# Patient Record
Sex: FEMALE | Race: ASIAN | NOT HISPANIC OR LATINO | Employment: UNEMPLOYED | URBAN - METROPOLITAN AREA
[De-identification: names, ages, dates, MRNs, and addresses within clinical notes are randomized per-mention and may not be internally consistent; named-entity substitution may affect disease eponyms.]

---

## 2019-03-01 ENCOUNTER — PATIENT MESSAGE (OUTPATIENT)
Dept: FAMILY MEDICINE | Facility: CLINIC | Age: 34
End: 2019-03-01

## 2019-03-01 ENCOUNTER — OFFICE VISIT (OUTPATIENT)
Dept: FAMILY MEDICINE | Facility: CLINIC | Age: 34
End: 2019-03-01
Payer: COMMERCIAL

## 2019-03-01 ENCOUNTER — LAB VISIT (OUTPATIENT)
Dept: LAB | Facility: HOSPITAL | Age: 34
End: 2019-03-01
Attending: INTERNAL MEDICINE
Payer: COMMERCIAL

## 2019-03-01 VITALS
WEIGHT: 175.06 LBS | DIASTOLIC BLOOD PRESSURE: 70 MMHG | HEIGHT: 64 IN | BODY MASS INDEX: 29.89 KG/M2 | HEART RATE: 65 BPM | SYSTOLIC BLOOD PRESSURE: 110 MMHG | TEMPERATURE: 98 F

## 2019-03-01 DIAGNOSIS — Z00.00 ANNUAL PHYSICAL EXAM: ICD-10-CM

## 2019-03-01 DIAGNOSIS — Z00.00 ANNUAL PHYSICAL EXAM: Primary | ICD-10-CM

## 2019-03-01 DIAGNOSIS — E04.2 MULTIPLE THYROID NODULES: ICD-10-CM

## 2019-03-01 DIAGNOSIS — R17 SERUM TOTAL BILIRUBIN ELEVATED: Primary | ICD-10-CM

## 2019-03-01 LAB
ALBUMIN SERPL BCP-MCNC: 4 G/DL
ALP SERPL-CCNC: 75 U/L
ALT SERPL W/O P-5'-P-CCNC: 15 U/L
ANION GAP SERPL CALC-SCNC: 10 MMOL/L
AST SERPL-CCNC: 17 U/L
BASOPHILS # BLD AUTO: 0.05 K/UL
BASOPHILS NFR BLD: 0.7 %
BILIRUB SERPL-MCNC: 1.4 MG/DL
BUN SERPL-MCNC: 12 MG/DL
CALCIUM SERPL-MCNC: 9.7 MG/DL
CHLORIDE SERPL-SCNC: 104 MMOL/L
CHOLEST SERPL-MCNC: 195 MG/DL
CHOLEST/HDLC SERPL: 4.1 {RATIO}
CO2 SERPL-SCNC: 25 MMOL/L
CREAT SERPL-MCNC: 0.8 MG/DL
DIFFERENTIAL METHOD: NORMAL
EOSINOPHIL # BLD AUTO: 0.4 K/UL
EOSINOPHIL NFR BLD: 5 %
ERYTHROCYTE [DISTWIDTH] IN BLOOD BY AUTOMATED COUNT: 11.6 %
EST. GFR  (AFRICAN AMERICAN): >60 ML/MIN/1.73 M^2
EST. GFR  (NON AFRICAN AMERICAN): >60 ML/MIN/1.73 M^2
ESTIMATED AVG GLUCOSE: 103 MG/DL
GLUCOSE SERPL-MCNC: 86 MG/DL
HBA1C MFR BLD HPLC: 5.2 %
HCT VFR BLD AUTO: 40.9 %
HDLC SERPL-MCNC: 47 MG/DL
HDLC SERPL: 24.1 %
HGB BLD-MCNC: 14.1 G/DL
IMM GRANULOCYTES # BLD AUTO: 0.01 K/UL
IMM GRANULOCYTES NFR BLD AUTO: 0.1 %
LDLC SERPL CALC-MCNC: 120.6 MG/DL
LYMPHOCYTES # BLD AUTO: 1.9 K/UL
LYMPHOCYTES NFR BLD: 27.6 %
MCH RBC QN AUTO: 31 PG
MCHC RBC AUTO-ENTMCNC: 34.5 G/DL
MCV RBC AUTO: 90 FL
MONOCYTES # BLD AUTO: 0.5 K/UL
MONOCYTES NFR BLD: 6.4 %
NEUTROPHILS # BLD AUTO: 4.2 K/UL
NEUTROPHILS NFR BLD: 60.2 %
NONHDLC SERPL-MCNC: 148 MG/DL
NRBC BLD-RTO: 0 /100 WBC
PLATELET # BLD AUTO: 282 K/UL
PMV BLD AUTO: 10.1 FL
POTASSIUM SERPL-SCNC: 4.4 MMOL/L
PROT SERPL-MCNC: 7.3 G/DL
RBC # BLD AUTO: 4.55 M/UL
SODIUM SERPL-SCNC: 139 MMOL/L
TRIGL SERPL-MCNC: 137 MG/DL
TSH SERPL DL<=0.005 MIU/L-ACNC: 0.42 UIU/ML
WBC # BLD AUTO: 6.99 K/UL

## 2019-03-01 PROCEDURE — 99385 PREV VISIT NEW AGE 18-39: CPT | Mod: S$GLB,,, | Performed by: INTERNAL MEDICINE

## 2019-03-01 PROCEDURE — 99385 PR PREVENTIVE VISIT,NEW,18-39: ICD-10-PCS | Mod: S$GLB,,, | Performed by: INTERNAL MEDICINE

## 2019-03-01 PROCEDURE — 80061 LIPID PANEL: CPT

## 2019-03-01 PROCEDURE — 84443 ASSAY THYROID STIM HORMONE: CPT

## 2019-03-01 PROCEDURE — 99999 PR PBB SHADOW E&M-NEW PATIENT-LVL III: ICD-10-PCS | Mod: PBBFAC,,, | Performed by: INTERNAL MEDICINE

## 2019-03-01 PROCEDURE — 80053 COMPREHEN METABOLIC PANEL: CPT

## 2019-03-01 PROCEDURE — 85025 COMPLETE CBC W/AUTO DIFF WBC: CPT

## 2019-03-01 PROCEDURE — 99999 PR PBB SHADOW E&M-NEW PATIENT-LVL III: CPT | Mod: PBBFAC,,, | Performed by: INTERNAL MEDICINE

## 2019-03-01 PROCEDURE — 36415 COLL VENOUS BLD VENIPUNCTURE: CPT | Mod: PO

## 2019-03-01 PROCEDURE — 83036 HEMOGLOBIN GLYCOSYLATED A1C: CPT

## 2019-03-01 NOTE — PROGRESS NOTES
Subjective:        Patient ID: Janneth Javier is a 33 y.o. female.    Chief Complaint: Establish Care and Annual Exam    HPI   Janneth Javier presents for annual exam.    Pt is accompanied by her .    Review of Systems   Constitutional: Negative for activity change.   HENT: Negative for hearing loss.    Eyes: Negative for visual disturbance (contact lenses UTD).   Respiratory: Negative for shortness of breath.    Cardiovascular: Negative for chest pain and leg swelling.   Gastrointestinal: Negative for abdominal pain.   Genitourinary: Negative for menstrual problem (usually q28-32 days; missed 2 cycles (Dec, Jan), LMP 2/13/19 normal, usually doesn't miss periods, neg pregnancy tests) and vaginal discharge.   Musculoskeletal: Negative for gait problem.   Skin: Negative for rash.   Allergic/Immunologic: Negative for immunocompromised state.   Neurological: Negative for weakness.   Hematological: Negative for adenopathy.   Psychiatric/Behavioral: Negative for confusion.           Objective:        Vitals:    03/01/19 0757   BP: 110/70   Pulse: 65   Temp: 98.1 °F (36.7 °C)     Physical Exam   Constitutional: She is oriented to person, place, and time. She appears well-developed and well-nourished. No distress.   HENT:   Head: Normocephalic and atraumatic.   Right Ear: External ear normal.   Left Ear: External ear normal.   Nose: Nose normal.   - bilateral ear canals clear, tympanic membranes visualized - normal color and light reflex   Eyes: Conjunctivae and EOM are normal.   Neck: Normal range of motion. Neck supple. No thyromegaly present.   Cardiovascular: Normal rate, regular rhythm and normal heart sounds.   No murmur heard.  Pulmonary/Chest: Effort normal and breath sounds normal. No respiratory distress.   Abdominal: Soft. She exhibits no distension. There is no tenderness. There is no guarding.   Musculoskeletal: She exhibits no edema.   Lymphadenopathy:     She has no cervical adenopathy.   Neurological: She  is alert and oriented to person, place, and time.   Skin: Skin is warm and dry.   Psychiatric: She has a normal mood and affect. Her behavior is normal. Judgment and thought content normal.   Vitals reviewed.          Assessment:         1. Annual physical exam    2. Multiple thyroid nodules              Plan:         Janneth was seen today for establish care and annual exam.    Diagnoses and all orders for this visit:    Annual physical exam  - fasting labs today  - discussed tracking food intake to monitor carbs; pt has a significant famhx of DM  -     CBC auto differential; Future  -     Comprehensive metabolic panel; Future  -     Hemoglobin A1c; Future  -     Lipid panel; Future  -     TSH; Future    Multiple thyroid nodules: Pt had a thyroid US before that showed benign nodules.  Check TSH today.        Follow-up for pending lab results.

## 2019-03-04 ENCOUNTER — PATIENT MESSAGE (OUTPATIENT)
Dept: FAMILY MEDICINE | Facility: CLINIC | Age: 34
End: 2019-03-04

## 2019-04-15 ENCOUNTER — PATIENT MESSAGE (OUTPATIENT)
Dept: FAMILY MEDICINE | Facility: CLINIC | Age: 34
End: 2019-04-15

## 2019-04-27 ENCOUNTER — PATIENT MESSAGE (OUTPATIENT)
Dept: FAMILY MEDICINE | Facility: CLINIC | Age: 34
End: 2019-04-27

## 2019-04-27 DIAGNOSIS — L30.2 ID REACTION: ICD-10-CM

## 2019-04-27 DIAGNOSIS — R17 SERUM TOTAL BILIRUBIN ELEVATED: Primary | ICD-10-CM

## 2019-04-29 ENCOUNTER — LAB VISIT (OUTPATIENT)
Dept: LAB | Facility: HOSPITAL | Age: 34
End: 2019-04-29
Attending: INTERNAL MEDICINE
Payer: COMMERCIAL

## 2019-04-29 ENCOUNTER — CLINICAL SUPPORT (OUTPATIENT)
Dept: FAMILY MEDICINE | Facility: CLINIC | Age: 34
End: 2019-04-29
Payer: COMMERCIAL

## 2019-04-29 DIAGNOSIS — Z11.1 SCREENING-PULMONARY TB: Primary | ICD-10-CM

## 2019-04-29 DIAGNOSIS — R17 SERUM TOTAL BILIRUBIN ELEVATED: ICD-10-CM

## 2019-04-29 LAB
ALBUMIN SERPL BCP-MCNC: 4 G/DL (ref 3.5–5.2)
ALP SERPL-CCNC: 69 U/L (ref 55–135)
ALT SERPL W/O P-5'-P-CCNC: 16 U/L (ref 10–44)
AST SERPL-CCNC: 16 U/L (ref 10–40)
BILIRUB DIRECT SERPL-MCNC: 0.5 MG/DL (ref 0.1–0.3)
BILIRUB SERPL-MCNC: 1.4 MG/DL (ref 0.1–1)
PROT SERPL-MCNC: 7.3 G/DL (ref 6–8.4)

## 2019-04-29 PROCEDURE — 36415 COLL VENOUS BLD VENIPUNCTURE: CPT | Mod: PO

## 2019-04-29 PROCEDURE — 86580 TB INTRADERMAL TEST: CPT | Mod: S$GLB,,, | Performed by: INTERNAL MEDICINE

## 2019-04-29 PROCEDURE — 86580 POCT TB SKIN TEST: ICD-10-PCS | Mod: S$GLB,,, | Performed by: INTERNAL MEDICINE

## 2019-04-29 PROCEDURE — 99999 PR PBB SHADOW E&M-EST. PATIENT-LVL I: CPT | Mod: PBBFAC,,,

## 2019-04-29 PROCEDURE — 80076 HEPATIC FUNCTION PANEL: CPT

## 2019-04-29 PROCEDURE — 99999 PR PBB SHADOW E&M-EST. PATIENT-LVL I: ICD-10-PCS | Mod: PBBFAC,,,

## 2019-05-01 ENCOUNTER — CLINICAL SUPPORT (OUTPATIENT)
Dept: FAMILY MEDICINE | Facility: CLINIC | Age: 34
End: 2019-05-01
Payer: COMMERCIAL

## 2019-05-01 LAB
TB INDURATION - 48 HR READ: NORMAL MM
TB INDURATION - 72 HR READ: NORMAL MM
TB SKIN TEST - 48 HR READ: NEGATIVE
TB SKIN TEST - 72 HR READ: NORMAL

## 2019-05-01 PROCEDURE — 99999 PR PBB SHADOW E&M-EST. PATIENT-LVL I: ICD-10-PCS | Mod: PBBFAC,,,

## 2019-05-01 PROCEDURE — 99999 PR PBB SHADOW E&M-EST. PATIENT-LVL I: CPT | Mod: PBBFAC,,,

## 2019-05-03 ENCOUNTER — HOSPITAL ENCOUNTER (OUTPATIENT)
Dept: RADIOLOGY | Facility: OTHER | Age: 34
Discharge: HOME | End: 2019-05-03
Attending: INTERNAL MEDICINE
Payer: COMMERCIAL

## 2019-05-03 DIAGNOSIS — K86.2 PANCREATIC CYST: ICD-10-CM

## 2019-05-03 DIAGNOSIS — K86.2 PANCREATIC CYST: Primary | ICD-10-CM

## 2019-05-03 DIAGNOSIS — R17 SERUM TOTAL BILIRUBIN ELEVATED: ICD-10-CM

## 2019-05-03 PROCEDURE — 76700 US EXAM ABDOM COMPLETE: CPT | Mod: 26,,, | Performed by: RADIOLOGY

## 2019-05-03 PROCEDURE — 76700 US EXAM ABDOM COMPLETE: CPT | Mod: TC

## 2019-05-03 PROCEDURE — 76700 US ABDOMEN COMPLETE: ICD-10-PCS | Mod: 26,,, | Performed by: RADIOLOGY

## 2019-05-27 ENCOUNTER — TELEPHONE (OUTPATIENT)
Dept: GASTROENTEROLOGY | Facility: CLINIC | Age: 34
End: 2019-05-27

## 2019-05-27 NOTE — TELEPHONE ENCOUNTER
Please contact this patient to reschedule to [facility] for Pancreatic cyst and elevated LFTS

## 2019-05-27 NOTE — TELEPHONE ENCOUNTER
Spoke with patient in regards to rescheduling to [facility] for Pancreatic cyst. Patient is aware that appt is for 6/6/19 at 8:30am in [place].

## 2019-06-06 ENCOUNTER — TELEPHONE (OUTPATIENT)
Dept: GASTROENTEROLOGY | Facility: CLINIC | Age: 34
End: 2019-06-06

## 2019-06-06 ENCOUNTER — OFFICE VISIT (OUTPATIENT)
Dept: GASTROENTEROLOGY | Facility: CLINIC | Age: 34
End: 2019-06-06
Payer: COMMERCIAL

## 2019-06-06 VITALS — BODY MASS INDEX: 29.74 KG/M2 | WEIGHT: 173.31 LBS

## 2019-06-06 DIAGNOSIS — K86.2 PANCREATIC CYST: Primary | ICD-10-CM

## 2019-06-06 DIAGNOSIS — R17 SERUM TOTAL BILIRUBIN ELEVATED: ICD-10-CM

## 2019-06-06 DIAGNOSIS — K86.2 PANCREAS CYST: Primary | ICD-10-CM

## 2019-06-06 PROCEDURE — 99999 PR PBB SHADOW E&M-EST. PATIENT-LVL III: CPT | Mod: PBBFAC,,,

## 2019-06-06 PROCEDURE — 99203 OFFICE O/P NEW LOW 30 MIN: CPT | Mod: S$GLB,,, | Performed by: INTERNAL MEDICINE

## 2019-06-06 PROCEDURE — 99203 PR OFFICE/OUTPT VISIT, NEW, LEVL III, 30-44 MIN: ICD-10-PCS | Mod: S$GLB,,, | Performed by: INTERNAL MEDICINE

## 2019-06-06 PROCEDURE — 99999 PR PBB SHADOW E&M-EST. PATIENT-LVL III: ICD-10-PCS | Mod: PBBFAC,,,

## 2019-06-06 NOTE — PATIENT INSTRUCTIONS
Please call Hedy Chen [phone]        Your Upper EUS is scheduled for   TBA     At [facility] which is located at [address] [place] [address].  You will check in at the Hospital Admit Desk located on the 1st floor of the hospital. Please call the the office if you have any questions at [phone]      Upper Endoscopic Ultrasound    Endoscopic ultrasound(EUS) is a procedure used to image the digestive tract, including pancreas, lesions in esophagus and stomach.  It is used to diagnose and stage cancers of the digestive tract.  If necessary, your doctor may need to take samples during the procedure.    A responsible adult (family member or friend) must come with you and transport you home.  You are not allowed to drive, take a taxi or bus or leave the Endoscopy Center alone.  If you do not have a responsible adult with you to take you home, your exam will be cancelled.     If you have questions about the cost of your procedure, you should contact your insurance company as soon as possible.  Please bring a picture ID and your insurance card.  You will sign  treatment authorization forms at check in.  It is necessary for you to sign these forms again even if you recently signed these at the time of your clinic visit.    Please follow instructions of  if you are taking anticoagulant/blood thinning medications such as Aggrenox, aspirin, Brilinta, Effient, Eliquis, Lovenox, Plavix, Pletal, Pradaxa, Ticilid, Xarelto or Coumadin.     Take blood pressure, heart, anti-rejection and or seizure medication at 600 am the morning of the procedure.    Skip your morning dose of insulin or other oral medications for diabetes the morning of the procedure unless instructed otherwise by your doctor.      Day Before The Procedure    Eat a light evening meal and eat no solid food after 7 pm.  You may drink clear liquids including:    Water, Coffee or decaffeinated coffee (no milk or cream)  Tea, Herbal  tea  Carbonated beverages (soft drinks), regular and sugar free  Gelatin  Apple Juice, grape juice, white cranberry juice  Gatorade, Power Aid, Crystal Light, [employer]  Lemonade and Limeade  Bouillon, clear consomme'  Snowball, popsicles  DO NOT DRINK ANY LIQUIDS CONTAINING RED DYE  DO NOT DRINK ANY LIQUIDS NOT SPECIFICALLY LISTED  DO NOT DRINK ALCOHOL  NOTHING BY MOUTH AFTER MIDNIGHT    Day of Procedure    600 am take last dose of any medications you are allowed to take with a small amount of clear liquid

## 2019-06-06 NOTE — PROGRESS NOTES
[facility] Clinic Note    Reason for Visit:  The encounter diagnosis was Pancreas cyst.    PCP: Bella Garner   No address on file    HPI:  This is a 33 y.o. female here for evaluation of incidental pancreas cyst. The patient have history of mildly elevated bilirubin for what an abdominal US was performed.    US 5/3/2019    1. 1.1 x 1.3 x 1.4-cm cystic lesion that appears to project from the pancreatic tail with a single thin internal septation without appreciable vascularity, nodularity or calcifications.  Differential diagnosis includes mimics such as fluid within the lesser sac or duodenal diverticulum, as well as true pancreatic lesions such as pancreatic pseudocyst in the setting of known history of pancreatitis, serous cystadenoma, mucinous cystic neoplasm and IPMN.  Consider contrast enhanced cross-sectional imaging (CT/MR) for more accurate assessment if clinically indicated.    The patient denied abdominal pain or diarrhea. No family history of pancreatitis or pancreatic cancer. The patient denied previous history of pancreatitis. No smoking.        ROS:  CV: No chest pain or palpitations  Pulm: No cough, No shortness of breath  GI: see HPI  Derm: stated very sensitive skin  Heme: No lymphadenopathy, No bruising, No blood transfusions  MSK: No arthritis      Medical History:  has a past medical history of Multiple thyroid nodules (3/1/2019).    Surgical History:  has no past surgical history on file.    Family History: family history includes Diabetes Mellitus in her mother; Hyperlipidemia in her mother; Hypertension in her mother; Lung cancer in her paternal grandmother; Stomach cancer in her father..     Social History:  reports that she has never smoked. She does not have any smokeless tobacco history on file.    Review of patient's allergies indicates:  No Known Allergies    Objective Findings:    Vital Signs:  Wt 78.6 kg (173 lb 4.5 oz)   BMI 29.74 kg/m²   Body mass index is 29.74 kg/m².    Physical  Exam:  General Appearance: Well appearing in no acute distress  HEENT:   Normocephalic, without obvious abnormality.  Sclera anicteric, no conjunctival pallor, Lips, mucosa, and tongue pink and dry; teeth and gums normal  Lungs: CTA bilaterally in anterior and posterior fields, no wheezes, no crackles.  Heart:  Regular rate and rhythm, S1, S2 normal, no murmurs heard  Abdomen: Soft, non tender, non distended with positive bowel sounds in all four quadrants. No hepatosplenomegaly, ascites, or mass  Extremities: no cyanosis or edema  Neurologic: Grossly normal      Labs:  Lab Results   Component Value Date    WBC 6.99 03/01/2019    HGB 14.1 03/01/2019    HCT 40.9 03/01/2019     03/01/2019    CHOL 195 03/01/2019    TRIG 137 03/01/2019    HDL 47 03/01/2019    ALT 16 04/29/2019    AST 16 04/29/2019     03/01/2019    K 4.4 03/01/2019     03/01/2019    CREATININE 0.8 03/01/2019    BUN 12 03/01/2019    CO2 25 03/01/2019    TSH 0.418 03/01/2019    HGBA1C 5.2 03/01/2019              Assessment:  1. Pancreas cyst    The different pancreas cyst were discussed as well the natural history of this lesions including the potential for malignant transformation. We recommend to proceed with further diagnostic evaluation.       Recommendations:  1. Options included EUS vs CT scan vs MRI. All the diagnostic procedures were explained to the patient with the pros and cons. The patient will make a decision and get back to us.         Order summary:  No orders of the defined types were placed in this encounter.      Thank you so much for allowing me to participate in the care of Janneth Bearden MD

## 2019-06-26 ENCOUNTER — TELEPHONE (OUTPATIENT)
Dept: ENDOSCOPY | Facility: HOSPITAL | Age: 34
End: 2019-06-26

## 2019-06-26 NOTE — TELEPHONE ENCOUNTER
Left a message to call back at    Bet 8am to 4pm  For Upper EUS on Friday 6/28/19  Arrival time at 0730 am  Sent a message through her Portal

## 2019-06-26 NOTE — TELEPHONE ENCOUNTER
Spoke with patient about arrival time @ *.[phone]     NPO status reviewed: Patient may eat until 7:00pm.  After 7pm, pt may have CLEAR liquids ONLY until completely NPO at Midnight.    Medications: Do not take Insulin or oral diabetic medications the day of the procedure.    Take as prescribed: heart, seizure and blood pressure medication in the morning with a sip of water (less than an ounce).  Take any breathing medications and bring inhalers to hospital with you.     Leave all valuables and jewelry at home. Wear comfortable clothes to procedure to change into hospital gown.   You cannot drive for 24 hours after your procedure because you will receive sedation for your procedure to make you comfortable.    A ride must be provided at discharge.

## 2019-06-28 ENCOUNTER — HOSPITAL ENCOUNTER (OUTPATIENT)
Facility: HOSPITAL | Age: 34
Discharge: HOME | End: 2019-06-28
Attending: INTERNAL MEDICINE | Admitting: INTERNAL MEDICINE
Payer: COMMERCIAL

## 2019-06-28 ENCOUNTER — ANESTHESIA (OUTPATIENT)
Dept: ENDOSCOPY | Facility: HOSPITAL | Age: 34
End: 2019-06-28
Payer: COMMERCIAL

## 2019-06-28 ENCOUNTER — ANESTHESIA EVENT (OUTPATIENT)
Dept: ENDOSCOPY | Facility: HOSPITAL | Age: 34
End: 2019-06-28
Payer: COMMERCIAL

## 2019-06-28 VITALS
HEART RATE: 56 BPM | DIASTOLIC BLOOD PRESSURE: 77 MMHG | RESPIRATION RATE: 17 BRPM | OXYGEN SATURATION: 99 % | TEMPERATURE: 98 F | SYSTOLIC BLOOD PRESSURE: 123 MMHG | BODY MASS INDEX: 30.12 KG/M2 | HEIGHT: 63 IN | WEIGHT: 170 LBS

## 2019-06-28 DIAGNOSIS — K86.2 PANCREAS CYST: ICD-10-CM

## 2019-06-28 DIAGNOSIS — K86.2 PANCREATIC CYST: Primary | ICD-10-CM

## 2019-06-28 LAB
B-HCG UR QL: NEGATIVE
CTP QC/QA: YES

## 2019-06-28 PROCEDURE — 63600175 PHARM REV CODE 636 W HCPCS: Performed by: NURSE ANESTHETIST, CERTIFIED REGISTERED

## 2019-06-28 PROCEDURE — 43259 EGD US EXAM DUODENUM/JEJUNUM: CPT | Performed by: INTERNAL MEDICINE

## 2019-06-28 PROCEDURE — 25000003 PHARM REV CODE 250: Performed by: INTERNAL MEDICINE

## 2019-06-28 PROCEDURE — 43237 PR ENDOSCOPIC US EXAM, ESOPH: ICD-10-PCS | Mod: ,,, | Performed by: INTERNAL MEDICINE

## 2019-06-28 PROCEDURE — 37000008 HC ANESTHESIA 1ST 15 MINUTES: Performed by: INTERNAL MEDICINE

## 2019-06-28 PROCEDURE — 43237 ENDOSCOPIC US EXAM ESOPH: CPT | Mod: ,,, | Performed by: INTERNAL MEDICINE

## 2019-06-28 PROCEDURE — 37000009 HC ANESTHESIA EA ADD 15 MINS: Performed by: INTERNAL MEDICINE

## 2019-06-28 PROCEDURE — 81025 URINE PREGNANCY TEST: CPT | Performed by: INTERNAL MEDICINE

## 2019-06-28 RX ORDER — SODIUM CHLORIDE 0.9 % (FLUSH) 0.9 %
10 SYRINGE (ML) INJECTION
Status: ACTIVE | OUTPATIENT
Start: 2019-06-28

## 2019-06-28 RX ORDER — SODIUM CHLORIDE 9 MG/ML
INJECTION, SOLUTION INTRAVENOUS CONTINUOUS
Status: ACTIVE | OUTPATIENT
Start: 2019-06-28

## 2019-06-28 RX ORDER — PROPOFOL 10 MG/ML
VIAL (ML) INTRAVENOUS CONTINUOUS PRN
Status: DISCONTINUED | OUTPATIENT
Start: 2019-06-28 | End: 2019-06-28

## 2019-06-28 RX ORDER — LIDOCAINE HCL/PF 100 MG/5ML
SYRINGE (ML) INTRAVENOUS
Status: DISCONTINUED | OUTPATIENT
Start: 2019-06-28 | End: 2019-06-28

## 2019-06-28 RX ORDER — PROPOFOL 10 MG/ML
VIAL (ML) INTRAVENOUS
Status: DISCONTINUED | OUTPATIENT
Start: 2019-06-28 | End: 2019-06-28

## 2019-06-28 RX ADMIN — PROPOFOL 150 MCG/KG/MIN: 10 INJECTION, EMULSION INTRAVENOUS at 09:06

## 2019-06-28 RX ADMIN — PROPOFOL 70 MG: 10 INJECTION, EMULSION INTRAVENOUS at 09:06

## 2019-06-28 RX ADMIN — LIDOCAINE HYDROCHLORIDE 75 MG: 20 INJECTION, SOLUTION INTRAVENOUS at 09:06

## 2019-06-28 RX ADMIN — SODIUM CHLORIDE: 0.9 INJECTION, SOLUTION INTRAVENOUS at 08:06

## 2019-06-28 RX ADMIN — PROPOFOL 20 MG: 10 INJECTION, EMULSION INTRAVENOUS at 09:06

## 2019-06-28 NOTE — ANESTHESIA POSTPROCEDURE EVALUATION
Anesthesia Post Evaluation    Patient: Janneth Javier    Procedure(s) Performed: Procedure(s) (LRB):  ULTRASOUND-ENDOSCOPIC-UPPER (N/A)    Final Anesthesia Type: MAC  Patient location during evaluation: PACU  Patient participation: Yes- Able to Participate  Level of consciousness: awake and alert  Post-procedure vital signs: reviewed and stable  Pain management: adequate  Airway patency: patent  PONV status at discharge: No PONV  Anesthetic complications: no      Cardiovascular status: hemodynamically stable and blood pressure returned to baseline  Respiratory status: unassisted, spontaneous ventilation and room air  Hydration status: euvolemic  Follow-up not needed.          Vitals Value Taken Time   /77 6/28/2019 10:25 AM   Temp 36.9 °C (98.4 °F) 6/28/2019  9:55 AM   Pulse 56 6/28/2019 10:25 AM   Resp 17 6/28/2019 10:25 AM   SpO2 99 % 6/28/2019 10:25 AM         No case tracking events are documented in the log.      Pain/Shannan Score: Shannan Score: 10 (6/28/2019 10:25 AM)

## 2019-06-28 NOTE — ANESTHESIA PREPROCEDURE EVALUATION
06/28/2019  Janneth Javier is a 34 y.o., female for upper EUS under MAC    Past Medical History:   Diagnosis Date    Multiple thyroid nodules 3/1/2019    US showed benign nodules         Anesthesia Evaluation    I have reviewed the Patient Summary Reports.    I have reviewed the Nursing Notes.   I have reviewed the Medications.     Review of Systems  Social:  Non-Smoker        Physical Exam  General:  Obesity    Airway/Jaw/Neck:  Airway Findings: Mallampati: II      Chest/Lungs:  Chest/Lungs Clear    Heart/Vascular:  Heart Findings: Normal            Anesthesia Plan  Type of Anesthesia, risks & benefits discussed:  Anesthesia Type:  MAC, general  Patient's Preference:   Intra-op Monitoring Plan:   Intra-op Monitoring Plan Comments:   Post Op Pain Control Plan:   Post Op Pain Control Plan Comments:   Induction:    Beta Blocker:  Patient is not currently on a Beta-Blocker (No further documentation required).       Informed Consent: Patient understands risks and agrees with Anesthesia plan.  Questions answered. Anesthesia consent signed with patient.  ASA Score: 2     Day of Surgery Review of History & Physical:            Ready For Surgery From Anesthesia Perspective.

## 2019-06-28 NOTE — PLAN OF CARE
Past Medical History:   Diagnosis Date    Multiple thyroid nodules 3/1/2019    US showed benign nodules     Admission process complete. Patient ready for procedure. Plan of care reviewed with patient. Preoperative fasting appropriate for procedure and sedation. Call light in reach and bed in lowest position.

## 2019-06-28 NOTE — H&P
Short Stay Endoscopy History and Physical    PCP - Bella Garner MD  Referring Physician - Bella Garner MD  [address] [place] [address]    Procedure - eus  ASA - per anesthesia  Mallampati - per anesthesia  History of Anesthesia problems - no  Family history Anesthesia problems -  no   Plan of anesthesia - General    HPI:  This is a 34 y.o. female here for evaluation of: pancreas cyst    Reflux - no  Dysphagia - no  Abdominal pain - no  Diarrhea - no    ROS:  Constitutional: No fevers, chills, No weight loss  CV: No chest pain  Pulm: No cough, No shortness of breath  Ophtho: No vision changes  GI: see HPI  Derm: No rash    Medical History:  has a past medical history of Multiple thyroid nodules (3/1/2019).    Surgical History:  has no past surgical history on file.    Family History: family history includes Diabetes Mellitus in her mother; Hyperlipidemia in her mother; Hypertension in her mother; Lung cancer in her paternal grandmother; Stomach cancer in her father..    Social History:  reports that she has never smoked. She has never used smokeless tobacco. She reports that she drinks alcohol. She reports that she does not use drugs.    Review of patient's allergies indicates:  No Known Allergies    Medications:   No medications prior to admission.       Physical Exam:    Vital Signs:   Vitals:    06/28/19 0808   BP: 120/66   Pulse: (!) 59   Resp: 20   Temp: 98.8 °F (37.1 °C)       General Appearance: Well appearing in no acute distress    Labs:  Lab Results   Component Value Date    WBC 6.99 03/01/2019    HGB 14.1 03/01/2019    HCT 40.9 03/01/2019     03/01/2019    CHOL 195 03/01/2019    TRIG 137 03/01/2019    HDL 47 03/01/2019    ALT 16 04/29/2019    AST 16 04/29/2019     03/01/2019    K 4.4 03/01/2019     03/01/2019    CREATININE 0.8 03/01/2019    BUN 12 03/01/2019    CO2 25 03/01/2019    TSH 0.418 03/01/2019    HGBA1C 5.2 03/01/2019       I have explained  the risks and benefits of this endoscopic procedure to the patient including but not limited to bleeding, inflammation, infection, perforation, and death.      Roe Pereira MD

## 2019-06-28 NOTE — [employer] PATIENT INSTRUCTIONS
Discharge Summary/Instructions after an Endoscopic Procedure  Patient Name: Janneth Javier  Patient MRN: [id number]  Patient YOB: 1985 Friday, June 28, 2019  Roe Pereira MD  RESTRICTIONS:  During your procedure today, you received medications for sedation.  These   medications may affect your judgment, balance and coordination.  Therefore,   for 24 hours, you have the following restrictions:   - DO NOT drive a car, operate machinery, make legal/financial decisions,   sign important papers or drink alcohol.    ACTIVITY:  Today: no heavy lifting, straining or running due to procedural   sedation/anesthesia.  The following day: return to full activity including work.  DIET:  Eat and drink normally unless instructed otherwise.     TREATMENT FOR COMMON SIDE EFFECTS:  - Mild abdominal pain, nausea, belching, bloating or excessive gas:  rest,   eat lightly and use a heating pad.  - Sore Throat: treat with throat lozenges and/or gargle with warm salt   water.  - Because air was used during the procedure, expelling large amounts of air   from your rectum or belching is normal.  - If a bowel prep was taken, you may not have a bowel movement for 1-3 days.    This is normal.  SYMPTOMS TO WATCH FOR AND REPORT TO YOUR PHYSICIAN:  1. Abdominal pain or bloating, other than gas cramps.  2. Chest pain.  3. Back pain.  4. Signs of infection such as: chills or fever occurring within 24 hours   after the procedure.  5. Rectal bleeding, which would show as bright red, maroon, or black stools.   (A tablespoon of blood from the rectum is not serious, especially if   hemorrhoids are present.)  6. Vomiting.  7. Weakness or dizziness.  GO DIRECTLY TO THE NEAREST EMERGENCY ROOM IF YOU HAVE ANY OF THE FOLLOWING:      Difficulty breathing              Chills and/or fever over 101 F   Persistent vomiting and/or vomiting blood   Severe abdominal pain   Severe chest pain   Black, tarry stools   Bleeding- more than one tablespoon   Any  other symptom or condition that you feel may need urgent attention  Your doctor recommends these additional instructions:  If any biopsies were taken, your doctors clinic will contact you in 1 to 2   weeks with any results.  - Discharge patient to home.   - Resume previous diet.   - Continue present medications.   - Perform magnetic resonance imaging (MRI) with gadolinium in 6 months.  For questions, problems or results please call your physician - Roe Pereira MD at Work:  [phone].  EMERGENCY PHONE NUMBER: [phone],  LAB RESULTS: [phone]  IF A COMPLICATION OR EMERGENCY SITUATION ARISES AND YOU ARE UNABLE TO REACH   YOUR PHYSICIAN - GO DIRECTLY TO THE EMERGENCY ROOM.  Roe Pereira MD  6/28/2019 9:51:29 AM  This report has been verified and signed electronically.  [employer]

## 2019-06-28 NOTE — TRANSFER OF CARE
"Anesthesia Transfer of Care Note    Patient: Janneth Javier    Procedure(s) Performed: Procedure(s) (LRB):  ULTRASOUND-ENDOSCOPIC-UPPER (N/A)    Patient location: GI    Anesthesia Type: MAC    Transport from OR: Transported from OR on room air with adequate spontaneous ventilation    Post pain: adequate analgesia    Post assessment: no apparent anesthetic complications and tolerated procedure well    Post vital signs: stable    Level of consciousness: responds to stimulation and sedated    Nausea/Vomiting: no nausea/vomiting    Complications: none    Transfer of care protocol was followed      Last vitals:   Visit Vitals  /66 (BP Location: Left arm, Patient Position: Lying)   Pulse (!) 59   Temp 37.1 °C (98.8 °F) (Temporal)   Resp 20   Ht 5' 3" (1.6 m)   Wt 77.1 kg (170 lb)   LMP 05/25/2019 (Exact Date)   SpO2 99%   Breastfeeding? No   BMI 30.11 kg/m²     "

## 2019-07-01 ENCOUNTER — TELEPHONE (OUTPATIENT)
Dept: GASTROENTEROLOGY | Facility: CLINIC | Age: 34
End: 2019-07-01

## 2019-07-01 NOTE — TELEPHONE ENCOUNTER
Post procedure Instructions: Perform magnetic resonance imaging (MRI) with gadolinium in 6 months.. Recall placed

## 2019-10-15 ENCOUNTER — OFFICE VISIT (OUTPATIENT)
Dept: INTERNAL MEDICINE | Facility: CLINIC | Age: 34
End: 2019-10-15
Payer: COMMERCIAL

## 2019-10-15 VITALS
BODY MASS INDEX: 30.23 KG/M2 | WEIGHT: 170.63 LBS | SYSTOLIC BLOOD PRESSURE: 110 MMHG | DIASTOLIC BLOOD PRESSURE: 74 MMHG | TEMPERATURE: 99 F | HEART RATE: 84 BPM | RESPIRATION RATE: 19 BRPM | HEIGHT: 63 IN

## 2019-10-15 DIAGNOSIS — R68.83 CHILLS: ICD-10-CM

## 2019-10-15 DIAGNOSIS — R61 NIGHT SWEATS: ICD-10-CM

## 2019-10-15 DIAGNOSIS — R52 BODY ACHES: Primary | ICD-10-CM

## 2019-10-15 LAB
INFLUENZA A, MOLECULAR: NEGATIVE
INFLUENZA B, MOLECULAR: NEGATIVE
SPECIMEN SOURCE: NORMAL

## 2019-10-15 PROCEDURE — 99213 PR OFFICE/OUTPT VISIT, EST, LEVL III, 20-29 MIN: ICD-10-PCS | Mod: S$GLB,,, | Performed by: NURSE PRACTITIONER

## 2019-10-15 PROCEDURE — 99213 OFFICE O/P EST LOW 20 MIN: CPT | Mod: S$GLB,,, | Performed by: NURSE PRACTITIONER

## 2019-10-15 PROCEDURE — 87502 INFLUENZA DNA AMP PROBE: CPT | Mod: PO

## 2019-10-15 PROCEDURE — 99999 PR PBB SHADOW E&M-EST. PATIENT-LVL III: ICD-10-PCS | Mod: PBBFAC,,, | Performed by: NURSE PRACTITIONER

## 2019-10-15 PROCEDURE — 99999 PR PBB SHADOW E&M-EST. PATIENT-LVL III: CPT | Mod: PBBFAC,,, | Performed by: NURSE PRACTITIONER

## 2019-10-15 RX ORDER — FLUTICASONE PROPIONATE 50 MCG
1 SPRAY, SUSPENSION (ML) NASAL DAILY
Qty: 9.9 ML | Refills: 0 | Status: SHIPPED | OUTPATIENT
Start: 2019-10-15 | End: 2020-08-30

## 2019-10-15 RX ORDER — METHYLPREDNISOLONE 4 MG/1
TABLET ORAL
Qty: 1 PACKAGE | Refills: 0 | Status: SHIPPED | OUTPATIENT
Start: 2019-10-15 | End: 2019-11-05

## 2019-10-15 NOTE — LETTER
October 15, 2019      [facility]                                                                                                                                                       [address] [facility]  [place] [address]  Phone: [phone]  Fax: [phone]       Patient: Janneth Javier   YOB: 1985  Date of Visit: 10/15/2019    To Whom It May Concern:    Carmen Javier  was at [facility] on 10/14/19- 10/15/2019. She may return to work/school- need to get flu results and will instruct. If you have any questions or concerns, or if I can be of further assistance, please do not hesitate to contact me.    Sincerely,      Delphine Ferreira NP

## 2019-10-15 NOTE — PROGRESS NOTES
[facility] Note    Chief Complaint      Chief Complaint   Patient presents with    Generalized Body Aches    Chills     And sweats; intermittently    Fatigue     History of Present Illness      Janneth Javier is a 34 y.o. female patient of Dr. Dai who is new to me presents today for c/o head congestion, sore throat, sneezing,  fatigue, chills, sweats and body aches since yesterday. Possible exposure to flu, friend came to visit that was sick.   Pt is a community nurse      Problem List Items Addressed This Visit     None      Visit Diagnoses     Body aches    -  Primary    Relevant Orders    Influenza A & B by Molecular    Chills        Relevant Orders    Influenza A & B by Molecular    Night sweats        Relevant Orders    Influenza A & B by Molecular          Health Maintenance   Topic Date Due    Pap Smear with HPV Cotest  03/01/2021    TETANUS VACCINE  03/01/2026    Lipid Panel  Completed       Past Medical History:   Diagnosis Date    Multiple thyroid nodules 3/1/2019    US showed benign nodules       Past Surgical History:   Procedure Laterality Date    ENDOSCOPIC ULTRASOUND OF UPPER GASTROINTESTINAL TRACT N/A 6/28/2019    Procedure: ULTRASOUND-ENDOSCOPIC-UPPER;  Surgeon: Roe Pereira MD;  Location: [facility];  Service: Endoscopy;  Laterality: N/A;       family history includes Diabetes Mellitus in her mother; Hyperlipidemia in her mother; Hypertension in her mother; Lung cancer in her paternal grandmother; Stomach cancer in her father.    Social History     Tobacco Use    Smoking status: Never Smoker    Smokeless tobacco: Never Used   Substance Use Topics    Alcohol use: Yes     Comment: occasional    Drug use: Never       Review of Systems   Constitutional: Positive for chills, diaphoresis and fever.   HENT: Positive for congestion.    Respiratory: Negative for cough and shortness of breath.    Cardiovascular: Negative for chest pain, palpitations and leg swelling.  "  Gastrointestinal: Negative for abdominal pain, diarrhea, nausea and vomiting.   Musculoskeletal: Negative for myalgias.   Neurological: Positive for weakness. Negative for dizziness and headaches.   Psychiatric/Behavioral: Negative for depression. The patient is not nervous/anxious.         No outpatient encounter medications on file as of 10/15/2019.     Facility-Administered Encounter Medications as of 10/15/2019   Medication Dose Route Frequency Provider Last Rate Last Dose    0.9%  NaCl infusion   Intravenous Continuous Roe Pereira MD 20 mL/hr at 06/28/19 0815      sodium chloride 0.9% flush 10 mL  10 mL Intravenous PRN Roe Pereira MD            Review of patient's allergies indicates:  No Known Allergies    Physical Exam      Vital Signs  Temp: 98.7 °F (37.1 °C)  Temp src: Oral  Pulse: 84  Resp: 19  BP: 110/74  BP Location: Left arm  Patient Position: Sitting  Pain Score: 0-No pain  Height and Weight  Height: 5' 3" (160 cm)  Weight: 77.4 kg (170 lb 10.2 oz)  BSA (Calculated - sq m): 1.85 sq meters  BMI (Calculated): 30.3  Weight in (lb) to have BMI = 25: 140.8]    Physical Exam   Constitutional: She is oriented to person, place, and time. She appears well-developed and well-nourished.   HENT:   Head: Normocephalic and atraumatic.   Right Ear: External ear normal.   Left Ear: External ear normal.   Mouth/Throat: Oropharynx is clear and moist.   Redness to bilateral ears, erythema noted to oropharynx   Eyes: Pupils are equal, round, and reactive to light. Conjunctivae and EOM are normal.   Neck: Normal range of motion. Neck supple.   Cardiovascular: Normal rate, regular rhythm, normal heart sounds and intact distal pulses.   No murmur heard.  Pulmonary/Chest: Effort normal and breath sounds normal. She exhibits no tenderness.   Abdominal: Soft.   Musculoskeletal: Normal range of motion.   Neurological: She is alert and oriented to person, place, and time.   Skin: Skin is warm and dry.   Psychiatric: She " has a normal mood and affect. Her behavior is normal. Judgment and thought content normal.   Nursing note reviewed.       Laboratory:  CBC:  No results for input(s): WBC, RBC, HGB, HCT, PLT, MCV, MCH, MCHC in the last 2160 hours.  CMP:  No results for input(s): GLU, CALCIUM, ALBUMIN, PROT, NA, K, CO2, CL, BUN, ALKPHOS, ALT, AST, BILITOT in the last 2160 hours.    Invalid input(s): CREATININ  URINALYSIS:  No results for input(s): COLORU, CLARITYU, SPECGRAV, PHUR, PROTEINUA, GLUCOSEU, BILIRUBINCON, BLOODU, WBCU, RBCU, BACTERIA, MUCUS, NITRITE, LEUKOCYTESUR, UROBILINOGEN, HYALINECASTS in the last 2160 hours.   LIPIDS:  No results for input(s): TSH, HDL, CHOL, TRIG, LDLCALC, CHOLHDL, NONHDLCHOL, TOTALCHOLEST in the last 2160 hours.  TSH:  No results for input(s): TSH in the last 2160 hours.  A1C:  No results for input(s): HGBA1C in the last 2160 hours.      Assessment/Plan     Janneth Javier is a 34 y.o.female with:    1. Chills  - Influenza A & B by Molecular    2. Night sweats  - Influenza A & B by Molecular    3. Body aches  - Influenza A & B by Molecular      -Continue current medications and maintain follow up with specialists.  Return to clinic as needed for any concerns       Erin Jiminez, NP-C [facility] [place]

## 2019-10-15 NOTE — LETTER
October 15, 2019      [facility]                                                                                                                                                       [address].  [place] [address]  Phone: [phone]  Fax: [phone]       Patient: Janneth Javier   YOB: 1985  Date of Visit: 10/15/2019    To Whom It May Concern:    Carmen Javier  was at [facility] on 10/14/19- 10/15/2019. She may return to work/school on 10/16/2019 with no restrictions. If you have any questions or concerns, or if I can be of further assistance, please do not hesitate to contact me.    Sincerely,    Delphine Ferreira NP

## 2019-11-03 ENCOUNTER — TELEPHONE (OUTPATIENT)
Dept: ENDOSCOPY | Facility: HOSPITAL | Age: 34
End: 2019-11-03

## 2019-11-03 DIAGNOSIS — K86.2 PANCREATIC CYST: ICD-10-CM

## 2019-12-30 ENCOUNTER — TELEPHONE (OUTPATIENT)
Dept: GASTROENTEROLOGY | Facility: CLINIC | Age: 34
End: 2019-12-30

## 2019-12-30 NOTE — TELEPHONE ENCOUNTER
Spoke with pt to schedule MRI but pt stated she wanted to think about having it done and will message via portal to inform Dr. Pereira if she decides to have it done. I verbalized understanding.

## 2020-01-06 ENCOUNTER — PATIENT MESSAGE (OUTPATIENT)
Dept: GASTROENTEROLOGY | Facility: CLINIC | Age: 35
End: 2020-01-06

## 2020-02-17 ENCOUNTER — PATIENT MESSAGE (OUTPATIENT)
Dept: GASTROENTEROLOGY | Facility: CLINIC | Age: 35
End: 2020-02-17

## 2020-03-03 ENCOUNTER — HOSPITAL ENCOUNTER (OUTPATIENT)
Dept: RADIOLOGY | Facility: HOSPITAL | Age: 35
Discharge: HOME | End: 2020-03-03
Attending: INTERNAL MEDICINE
Payer: COMMERCIAL

## 2020-03-03 DIAGNOSIS — K86.2 PANCREATIC CYST: ICD-10-CM

## 2020-03-03 PROCEDURE — 74183 MRI ABDOMEN WITH AND WO_INC MRCP: ICD-10-PCS | Mod: 26,,, | Performed by: RADIOLOGY

## 2020-03-03 PROCEDURE — 76376 3D RENDER W/INTRP POSTPROCES: CPT | Mod: 26,,, | Performed by: RADIOLOGY

## 2020-03-03 PROCEDURE — 76376 MRI ABDOMEN WITH AND WO_INC MRCP: ICD-10-PCS | Mod: 26,,, | Performed by: RADIOLOGY

## 2020-03-03 PROCEDURE — 25500020 PHARM REV CODE 255: Performed by: INTERNAL MEDICINE

## 2020-03-03 PROCEDURE — 74183 MRI ABD W/O CNTR FLWD CNTR: CPT | Mod: 26,,, | Performed by: RADIOLOGY

## 2020-03-03 PROCEDURE — 76376 3D RENDER W/INTRP POSTPROCES: CPT | Mod: TC

## 2020-03-03 PROCEDURE — A9585 GADOBUTROL INJECTION: HCPCS | Performed by: INTERNAL MEDICINE

## 2020-03-03 RX ORDER — GADOBUTROL 604.72 MG/ML
10 INJECTION INTRAVENOUS
Status: COMPLETED | OUTPATIENT
Start: 2020-03-03 | End: 2020-03-03

## 2020-03-03 RX ADMIN — GADOBUTROL 10 ML: 604.72 INJECTION INTRAVENOUS at 07:03

## 2020-07-15 ENCOUNTER — TELEPHONE (OUTPATIENT)
Dept: ENDOSCOPY | Facility: HOSPITAL | Age: 35
End: 2020-07-15

## 2020-07-15 DIAGNOSIS — K86.2 PANCREATIC CYST: Primary | ICD-10-CM

## 2020-07-20 ENCOUNTER — TELEPHONE (OUTPATIENT)
Dept: ENDOSCOPY | Facility: HOSPITAL | Age: 35
End: 2020-07-20

## 2020-07-21 ENCOUNTER — TELEPHONE (OUTPATIENT)
Dept: ENDOSCOPY | Facility: HOSPITAL | Age: 35
End: 2020-07-21

## 2020-07-21 NOTE — TELEPHONE ENCOUNTER
----- Message from Arian Delcid sent at 7/21/2020  8:33 AM CDT -----  Contact: pt  Pt returning a call to the clinic from Kendra         Please contact pt: [phone]

## 2020-07-22 ENCOUNTER — TELEPHONE (OUTPATIENT)
Dept: ENDOSCOPY | Facility: HOSPITAL | Age: 35
End: 2020-07-22

## 2020-07-22 ENCOUNTER — PATIENT MESSAGE (OUTPATIENT)
Dept: ENDOSCOPY | Facility: HOSPITAL | Age: 35
End: 2020-07-22

## 2020-07-22 DIAGNOSIS — K86.2 PANCREATIC CYST: ICD-10-CM

## 2020-07-22 NOTE — TELEPHONE ENCOUNTER
----- Message from Loli Cox sent at 7/22/2020  8:08 AM CDT -----  Regarding: PT  Contact: PT  PT returned a missed call from Gissell from yesterday. PT said she can leave a message on her phone or she can reach her through the portal bc she's not always able to answer her phone during the day. Please call back     Callback: [phone]

## 2020-08-31 ENCOUNTER — OFFICE VISIT (OUTPATIENT)
Dept: INTERNAL MEDICINE | Facility: CLINIC | Age: 35
End: 2020-08-31
Payer: COMMERCIAL

## 2020-08-31 ENCOUNTER — PATIENT MESSAGE (OUTPATIENT)
Dept: INTERNAL MEDICINE | Facility: CLINIC | Age: 35
End: 2020-08-31

## 2020-08-31 ENCOUNTER — PATIENT MESSAGE (OUTPATIENT)
Dept: GASTROENTEROLOGY | Facility: HOSPITAL | Age: 35
End: 2020-08-31

## 2020-08-31 ENCOUNTER — TELEPHONE (OUTPATIENT)
Dept: ENDOSCOPY | Facility: HOSPITAL | Age: 35
End: 2020-08-31

## 2020-08-31 ENCOUNTER — HOSPITAL ENCOUNTER (OUTPATIENT)
Dept: RADIOLOGY | Facility: HOSPITAL | Age: 35
Discharge: HOME | End: 2020-08-31
Attending: INTERNAL MEDICINE
Payer: COMMERCIAL

## 2020-08-31 VITALS
DIASTOLIC BLOOD PRESSURE: 60 MMHG | TEMPERATURE: 98 F | WEIGHT: 176.56 LBS | SYSTOLIC BLOOD PRESSURE: 122 MMHG | BODY MASS INDEX: 31.29 KG/M2 | HEART RATE: 64 BPM | RESPIRATION RATE: 16 BRPM | HEIGHT: 63 IN

## 2020-08-31 DIAGNOSIS — K86.2 PANCREAS CYST: Primary | ICD-10-CM

## 2020-08-31 DIAGNOSIS — R17 SERUM TOTAL BILIRUBIN ELEVATED: ICD-10-CM

## 2020-08-31 DIAGNOSIS — K86.2 PANCREATIC CYST: ICD-10-CM

## 2020-08-31 DIAGNOSIS — Z12.83 SKIN EXAM, SCREENING FOR CANCER: ICD-10-CM

## 2020-08-31 DIAGNOSIS — Z12.83 SKIN EXAM, SCREENING FOR CANCER: Primary | ICD-10-CM

## 2020-08-31 DIAGNOSIS — Z00.00 ANNUAL PHYSICAL EXAM: Primary | ICD-10-CM

## 2020-08-31 PROCEDURE — 99395 PR PREVENTIVE VISIT,EST,18-39: ICD-10-PCS | Mod: S$GLB,,, | Performed by: INTERNAL MEDICINE

## 2020-08-31 PROCEDURE — 76376 3D RENDER W/INTRP POSTPROCES: CPT | Mod: TC

## 2020-08-31 PROCEDURE — 99395 PREV VISIT EST AGE 18-39: CPT | Mod: S$GLB,,, | Performed by: INTERNAL MEDICINE

## 2020-08-31 PROCEDURE — 99999 PR PBB SHADOW E&M-EST. PATIENT-LVL III: CPT | Mod: PBBFAC,,, | Performed by: INTERNAL MEDICINE

## 2020-08-31 PROCEDURE — 76376 3D RENDER W/INTRP POSTPROCES: CPT | Mod: 26,,, | Performed by: RADIOLOGY

## 2020-08-31 PROCEDURE — 76376 MRI ABDOMEN WITHOUT CONTRAST MRCP: ICD-10-PCS | Mod: 26,,, | Performed by: RADIOLOGY

## 2020-08-31 PROCEDURE — 99999 PR PBB SHADOW E&M-EST. PATIENT-LVL III: ICD-10-PCS | Mod: PBBFAC,,, | Performed by: INTERNAL MEDICINE

## 2020-08-31 PROCEDURE — 74181 MRI ABDOMEN WITHOUT CONTRAST MRCP: ICD-10-PCS | Mod: 26,,, | Performed by: RADIOLOGY

## 2020-08-31 PROCEDURE — 74181 MRI ABDOMEN W/O CONTRAST: CPT | Mod: 26,,, | Performed by: RADIOLOGY

## 2020-08-31 PROCEDURE — 74181 MRI ABDOMEN W/O CONTRAST: CPT | Mod: TC

## 2020-09-01 ENCOUNTER — PATIENT MESSAGE (OUTPATIENT)
Dept: INTERNAL MEDICINE | Facility: CLINIC | Age: 35
End: 2020-09-01

## 2020-09-01 ENCOUNTER — TELEPHONE (OUTPATIENT)
Dept: GASTROENTEROLOGY | Facility: CLINIC | Age: 35
End: 2020-09-01

## 2020-09-01 ENCOUNTER — PATIENT OUTREACH (OUTPATIENT)
Dept: ADMINISTRATIVE | Facility: HOSPITAL | Age: 35
End: 2020-09-01

## 2020-09-01 DIAGNOSIS — Z01.812 PRE-PROCEDURE LAB EXAM: Primary | ICD-10-CM

## 2020-09-01 DIAGNOSIS — E78.1 HYPERTRIGLYCERIDEMIA: Primary | ICD-10-CM

## 2020-09-01 NOTE — TELEPHONE ENCOUNTER
Patient is scheduled with Dr Pereira on 9/22/2020. Covid screen 9/19/2020 at 9:00am. Patient notified via patient portal.

## 2020-09-02 ENCOUNTER — PATIENT MESSAGE (OUTPATIENT)
Dept: INTERNAL MEDICINE | Facility: CLINIC | Age: 35
End: 2020-09-02

## 2020-09-18 ENCOUNTER — TELEPHONE (OUTPATIENT)
Dept: ENDOSCOPY | Facility: HOSPITAL | Age: 35
End: 2020-09-18

## 2020-09-18 NOTE — TELEPHONE ENCOUNTER
Spoke with patient about arrival time @ 0700.   Covid test = pnd, 09/19/20    NPO status reviewed: Patient may eat until 7:00pm.  After 7pm, pt may have CLEAR liquids ONLY until completely NPO at Midnight.    Medications: Do not take Insulin or oral diabetic medications the day of the procedure.    Take as prescribed: heart, seizure and blood pressure medication in the morning with a sip of water (less than an ounce).  Take any breathing medications and bring inhalers to hospital with you.     Leave all valuables and jewelry at home. Wear comfortable clothes to procedure to change into hospital gown.   You cannot drive for 24 hours after your procedure because you will receive sedation for your procedure to make you comfortable.    A ride must be provided at discharge.

## 2020-09-19 ENCOUNTER — LAB VISIT (OUTPATIENT)
Dept: URGENT CARE | Facility: CLINIC | Age: 35
End: 2020-09-19
Payer: COMMERCIAL

## 2020-09-19 VITALS — OXYGEN SATURATION: 98 % | HEART RATE: 72 BPM | TEMPERATURE: 98 F

## 2020-09-19 DIAGNOSIS — Z01.812 PRE-PROCEDURE LAB EXAM: ICD-10-CM

## 2020-09-19 PROCEDURE — U0003 INFECTIOUS AGENT DETECTION BY NUCLEIC ACID (DNA OR RNA); SEVERE ACUTE RESPIRATORY SYNDROME CORONAVIRUS 2 (SARS-COV-2) (CORONAVIRUS DISEASE [COVID-19]), AMPLIFIED PROBE TECHNIQUE, MAKING USE OF HIGH THROUGHPUT TECHNOLOGIES AS DESCRIBED BY CMS-2020-01-R: HCPCS

## 2020-09-20 LAB — SARS-COV-2 RNA RESP QL NAA+PROBE: NOT DETECTED

## 2020-09-22 ENCOUNTER — ANESTHESIA EVENT (OUTPATIENT)
Dept: ENDOSCOPY | Facility: HOSPITAL | Age: 35
End: 2020-09-22
Payer: COMMERCIAL

## 2020-09-22 ENCOUNTER — ANESTHESIA (OUTPATIENT)
Dept: ENDOSCOPY | Facility: HOSPITAL | Age: 35
End: 2020-09-22
Payer: COMMERCIAL

## 2020-09-22 ENCOUNTER — HOSPITAL ENCOUNTER (OUTPATIENT)
Facility: HOSPITAL | Age: 35
Discharge: HOME | End: 2020-09-22
Attending: INTERNAL MEDICINE | Admitting: INTERNAL MEDICINE
Payer: COMMERCIAL

## 2020-09-22 VITALS
WEIGHT: 179 LBS | TEMPERATURE: 98 F | RESPIRATION RATE: 12 BRPM | SYSTOLIC BLOOD PRESSURE: 101 MMHG | HEIGHT: 63 IN | OXYGEN SATURATION: 100 % | BODY MASS INDEX: 31.71 KG/M2 | DIASTOLIC BLOOD PRESSURE: 77 MMHG | HEART RATE: 60 BPM

## 2020-09-22 DIAGNOSIS — K86.2 PANCREAS CYST: ICD-10-CM

## 2020-09-22 PROCEDURE — 82378 CARCINOEMBRYONIC ANTIGEN: CPT

## 2020-09-22 PROCEDURE — 63600175 PHARM REV CODE 636 W HCPCS: Performed by: NURSE ANESTHETIST, CERTIFIED REGISTERED

## 2020-09-22 PROCEDURE — 37000008 HC ANESTHESIA 1ST 15 MINUTES: Performed by: INTERNAL MEDICINE

## 2020-09-22 PROCEDURE — 37000009 HC ANESTHESIA EA ADD 15 MINS: Performed by: INTERNAL MEDICINE

## 2020-09-22 PROCEDURE — 27202059 HC NEEDLE, FNA (ANY): Performed by: INTERNAL MEDICINE

## 2020-09-22 PROCEDURE — 43242 EGD US FINE NEEDLE BX/ASPIR: CPT | Performed by: INTERNAL MEDICINE

## 2020-09-22 PROCEDURE — 25000003 PHARM REV CODE 250: Performed by: INTERNAL MEDICINE

## 2020-09-22 PROCEDURE — 43242 PR UPGI ENDOSCOPY,FN NEEDLE BX,GUIDED: ICD-10-PCS | Mod: ,,, | Performed by: INTERNAL MEDICINE

## 2020-09-22 PROCEDURE — 82150 ASSAY OF AMYLASE: CPT

## 2020-09-22 PROCEDURE — 43242 EGD US FINE NEEDLE BX/ASPIR: CPT | Mod: ,,, | Performed by: INTERNAL MEDICINE

## 2020-09-22 RX ORDER — SODIUM CHLORIDE 0.9 % (FLUSH) 0.9 %
10 SYRINGE (ML) INJECTION
Status: DISCONTINUED | OUTPATIENT
Start: 2020-09-22 | End: 2020-09-22 | Stop reason: HOSPADM

## 2020-09-22 RX ORDER — SODIUM CHLORIDE 9 MG/ML
INJECTION, SOLUTION INTRAVENOUS CONTINUOUS
Status: DISCONTINUED | OUTPATIENT
Start: 2020-09-22 | End: 2020-09-22 | Stop reason: HOSPADM

## 2020-09-22 RX ORDER — PROPOFOL 10 MG/ML
VIAL (ML) INTRAVENOUS CONTINUOUS PRN
Status: DISCONTINUED | OUTPATIENT
Start: 2020-09-22 | End: 2020-09-22

## 2020-09-22 RX ORDER — LIDOCAINE HYDROCHLORIDE 20 MG/ML
INJECTION INTRAVENOUS
Status: DISCONTINUED | OUTPATIENT
Start: 2020-09-22 | End: 2020-09-22

## 2020-09-22 RX ORDER — FENTANYL CITRATE 50 UG/ML
INJECTION, SOLUTION INTRAMUSCULAR; INTRAVENOUS
Status: DISCONTINUED | OUTPATIENT
Start: 2020-09-22 | End: 2020-09-22

## 2020-09-22 RX ORDER — PROPOFOL 10 MG/ML
VIAL (ML) INTRAVENOUS
Status: DISCONTINUED | OUTPATIENT
Start: 2020-09-22 | End: 2020-09-22

## 2020-09-22 RX ADMIN — SODIUM CHLORIDE: 0.9 INJECTION, SOLUTION INTRAVENOUS at 08:09

## 2020-09-22 RX ADMIN — PROPOFOL 80 MG: 10 INJECTION, EMULSION INTRAVENOUS at 08:09

## 2020-09-22 RX ADMIN — LIDOCAINE HYDROCHLORIDE 100 MG: 20 INJECTION, SOLUTION INTRAVENOUS at 08:09

## 2020-09-22 RX ADMIN — PROPOFOL 150 MCG/KG/MIN: 10 INJECTION, EMULSION INTRAVENOUS at 08:09

## 2020-09-22 RX ADMIN — FENTANYL CITRATE 50 MCG: 50 INJECTION, SOLUTION INTRAMUSCULAR; INTRAVENOUS at 08:09

## 2020-09-22 NOTE — H&P
Short Stay Endoscopy History and Physical    PCP - Eunice Presley MD  Referring Physician - Roe Pereira MD  [address]  [address]  [place] [address]    Procedure - eus  ASA - per anesthesia  Mallampati - per anesthesia  History of Anesthesia problems - no  Family history Anesthesia problems -  no   Plan of anesthesia - General    HPI:  This is a 35 y.o. female here for evaluation of: pancreas cyst    Reflux - no  Dysphagia - no  Abdominal pain - no  Diarrhea - no    ROS:  Constitutional: No fevers, chills, No weight loss  CV: No chest pain  Pulm: No cough, No shortness of breath  Ophtho: No vision changes  GI: see HPI  Derm: No rash    Medical History:  has a past medical history of Multiple thyroid nodules (3/1/2019).    Surgical History:  has a past surgical history that includes Endoscopic ultrasound of upper gastrointestinal tract (N/A, 6/28/2019) and arm surgery.    Family History: family history includes Diabetes Mellitus in her mother; Hyperlipidemia in her mother; Hypertension in her mother; Lung cancer in her paternal grandmother; Stomach cancer in her father..    Social History:  reports that she has never smoked. She has never used smokeless tobacco. She reports current alcohol use. She reports that she does not use drugs.    Review of patient's allergies indicates:  No Known Allergies    Medications:   No medications prior to admission.       Physical Exam:    Vital Signs:   Vitals:    09/22/20 0735   BP: 118/69   Pulse: 67   Resp: 16   Temp: 98.2 °F (36.8 °C)       General Appearance: Well appearing in no acute distress    Labs:  Lab Results   Component Value Date    WBC 6.18 08/31/2020    HGB 14.3 08/31/2020    HCT 43.2 08/31/2020     08/31/2020    CHOL 233 (H) 08/31/2020    TRIG 783 (H) 08/31/2020    HDL 32 (L) 08/31/2020    ALT 28 08/31/2020    AST 22 08/31/2020     08/31/2020    K 4.4 08/31/2020     08/31/2020    CREATININE 0.9 08/31/2020    BUN 15 08/31/2020     CO2 21 (L) 08/31/2020    TSH 0.935 08/31/2020    HGBA1C 5.1 08/31/2020       I have explained the risks and benefits of this endoscopic procedure to the patient including but not limited to bleeding, inflammation, infection, perforation, and death.      Roe Pereira MD

## 2020-09-22 NOTE — ANESTHESIA POSTPROCEDURE EVALUATION
Anesthesia Post Evaluation    Patient: Janneth Javier    Procedure(s) Performed: Procedure(s) (LRB):  ULTRASOUND, UPPER GI TRACT, ENDOSCOPIC (N/A)    Final Anesthesia Type: MAC    Patient location during evaluation: GI PACU  Patient participation: Yes- Able to Participate  Level of consciousness: awake and alert and oriented  Post-procedure vital signs: reviewed and stable  Pain management: adequate  Airway patency: patent    PONV status at discharge: No PONV  Anesthetic complications: no      Cardiovascular status: blood pressure returned to baseline  Respiratory status: unassisted, spontaneous ventilation and room air  Hydration status: euvolemic  Follow-up not needed.          Vitals Value Taken Time   /68 09/22/20 0835   Temp 36.8 °C (98.2 °F) 09/22/20 0835   Pulse 69 09/22/20 0835   Resp 14 09/22/20 0835   SpO2 100 % 09/22/20 0835         No case tracking events are documented in the log.      Pain/Shannan Score: Shannan Score: 10 (9/22/2020  8:35 AM)

## 2020-09-22 NOTE — DISCHARGE INSTRUCTIONS
Endoscopic Ultrasound (EUS)    An endoscopic ultrasound (EUS) is a test to look at the inside of your gastrointestinal (GI) tract. It's commonly used to look for cancers or growths in the esophagus, stomach, pancreas, liver, and rectum. It can help to stage cancer (see how advanced a cancer is). EUS may also be used to help diagnose certain diseases or to drain cysts or abscesses.  What is EUS?  EUS shows both ultrasound images and live video of the GI tract. During the test, a flexible tube called an endoscope (scope) is used. At the end of the scope is a tiny video camera and light. The video camera sends live images to a monitor. The scope also contains a very small ultrasound device. This uses sound waves to create images and send them to a monitor.  A needle is passed through the scope. The needle can be used take a small sample of tissue for testing. This is called a biopsy. The needle can be used to take a sample of fluid. This is called fine-needle aspiration (FNA).  Risks and possible complications of EUS  Risks and possible complications include the following:  · Bleeding  · Infection  · A perforation (hole) in the digestive tract   · Risks of sedation or anesthesia   Before the test  Be prepared prior to the test:  · Tell your healthcare provider what medicine you take. This includes vitamins, herbs, and over-the-counter medicine. It also includes any blood thinners, such as warfarin, clopidogrel, ibuprofen, or daily aspirin. Ask your healthcare provider if you need to stop taking some or all of them before the test.  · You may be prescribed antibiotics to take before or after the test. This depends on the area being studied and what is done during the test. These medicines help prevent infection.  · Carefully follow the instructions for preparing for the test to make sure results are accurate. Instructions may include:  ¨ If youre having an EUS of the upper GI tract (esophagus, stomach, duodenum,  pancreas, liver):  § Do not eat or drink for 6 hours before the test.  ¨ If youre having an EUS of the lower GI tract (rectum):  § Before the test, do bowel prep as instructed to clean your rectum of stool. This may involve a clear liquid diet and using a laxative (liquid or pills) the night before the test. Or it may mean doing one or more enemas the morning of the test.  § Do not eat or drink for 6 hours before the test.  · Be sure to arrive on time at the facility. Bring your identification and health insurance card. Leave valuables at home. If you have them, bring X-rays or other test results with you.  Let the healthcare provider know  For your safety, tell the healthcare provider if you:  · Take insulin. Your dose may need to be changed on the day of your test.  · Are allergic to latex.  · Have any other allergies.  · Are taking blood thinners.   During the test  An endoscopic ultrasound usually takes place in a hospital. The procedure itself may take 1 to 2 hours. You will likely go home soon afterward. During the test:  · You lie on your left side on an exam table.  · An intravenous (IV) line will be put into a vein in your arm or hand. This line supplies fluids and medicines. To keep you comfortable during the test, you will be given a sedative medicine. This medicine prevents discomfort and will make you sleepy.  · If you are having an EUS of the upper GI tract, local anesthetic may be sprayed in your throat. This will help you be more comfortable as the healthcare provider inserts the scope. The healthcare provider then gently puts the flexible scope into your mouth or nose and down your throat.  · If youre having an EUS of the lower GI tract, the healthcare provider gently puts the flexible scope into your anus.  · During the test, the scope sends live video and ultrasound images from inside your body to nearby monitors. These are used to examine your GI tract. Specialized procedures, such as drainage,  are done as needed.  · The healthcare provider may discuss the results with you soon after the test. Biopsy results take several  days.  · In most cases, you can go home within a few hours of the test. When you leave the facility, have an adult family member or friend drive you, even if you don't feel that sleepy.  After the test  Here is what to expect after the test:  · You may feel tired from the sedative. This should wear off by the end of the day.  · If you had an upper digestive endoscopy, your throat may feel sore for a day or two. Over-the-counter sore throat lozenges and spray should help.  · You can eat and drink normally as soon as the test is done.  When to call the healthcare provider  Call your healthcare provider if you notice any of the following:  · Fever of 100.4°F (38.0°C) or higher, or as advised by your healthcare provider  · Shortness of breath  · Vomiting blood, blood in stool, or black stools  · Coughing or hoarse voice that wont go away   Date Last Reviewed: 7/1/2016  © 9351-2617 [employer]. [address] [place] [address]. All rights reserved. This information is not intended as a substitute for professional medical care. Always follow your healthcare professional's instructions.

## 2020-09-22 NOTE — TRANSFER OF CARE
"Anesthesia Transfer of Care Note    Patient: Janneth Javier    Procedure(s) Performed: Procedure(s) (LRB):  ULTRASOUND, UPPER GI TRACT, ENDOSCOPIC (N/A)    Patient location: GI    Anesthesia Type: MAC    Transport from OR: Transported from OR on room air with adequate spontaneous ventilation    Post pain: adequate analgesia    Post assessment: no apparent anesthetic complications    Post vital signs: stable    Level of consciousness: awake, alert and oriented    Nausea/Vomiting: no nausea/vomiting    Complications: none    Transfer of care protocol was followed      Last vitals:   Visit Vitals  /68 (BP Location: Left arm, Patient Position: Lying)   Pulse 69   Temp 36.8 °C (98.2 °F) (Temporal)   Resp 14   Ht 5' 3" (1.6 m)   Wt 81.2 kg (179 lb)   LMP 08/22/2020   SpO2 100%   Breastfeeding No   BMI 31.71 kg/m²     "

## 2020-09-22 NOTE — [employer] PATIENT INSTRUCTIONS
Discharge Summary/Instructions after an Endoscopic Procedure  Patient Name: Janneth Javier  Patient MRN: [id number]  Patient YOB: 1985 Tuesday, September 22, 2020  Roe Pereira MD  Your health is very important to us during the Covid Crisis. Following your   procedure today, you will receive a daily text for 2 weeks asking about   signs or symptoms of Covid 19.  Please respond to this text when you   receive it so we can follow up and keep you as safe as possible.   RESTRICTIONS:  During your procedure today, you received medications for sedation.  These   medications may affect your judgment, balance and coordination.  Therefore,   for 24 hours, you have the following restrictions:   - DO NOT drive a car, operate machinery, make legal/financial decisions,   sign important papers or drink alcohol.    ACTIVITY:  Today: no heavy lifting, straining or running due to procedural   sedation/anesthesia.  The following day: return to full activity including work.  DIET:  Eat and drink normally unless instructed otherwise.     TREATMENT FOR COMMON SIDE EFFECTS:  - Mild abdominal pain, nausea, belching, bloating or excessive gas:  rest,   eat lightly and use a heating pad.  - Sore Throat: treat with throat lozenges and/or gargle with warm salt   water.  - Because air was used during the procedure, expelling large amounts of air   from your rectum or belching is normal.  - If a bowel prep was taken, you may not have a bowel movement for 1-3 days.    This is normal.  SYMPTOMS TO WATCH FOR AND REPORT TO YOUR PHYSICIAN:  1. Abdominal pain or bloating, other than gas cramps.  2. Chest pain.  3. Back pain.  4. Signs of infection such as: chills or fever occurring within 24 hours   after the procedure.  5. Rectal bleeding, which would show as bright red, maroon, or black stools.   (A tablespoon of blood from the rectum is not serious, especially if   hemorrhoids are present.)  6. Vomiting.  7. Weakness or dizziness.  GO  DIRECTLY TO THE NEAREST EMERGENCY ROOM IF YOU HAVE ANY OF THE FOLLOWING:      Difficulty breathing              Chills and/or fever over 101 F   Persistent vomiting and/or vomiting blood   Severe abdominal pain   Severe chest pain   Black, tarry stools   Bleeding- more than one tablespoon   Any other symptom or condition that you feel may need urgent attention  Your doctor recommends these additional instructions:  If any biopsies were taken, your doctors clinic will contact you in 1 to 2   weeks with any results.  - Discharge patient to home.   - Resume previous diet.   - Await lab results.   - Perform magnetic resonance imaging (MRI) with gadolinium in 6 months.  For questions, problems or results please call your physician - Roe Pereira MD.  EMERGENCY PHONE NUMBER: [phone],  LAB RESULTS: [phone]  IF A COMPLICATION OR EMERGENCY SITUATION ARISES AND YOU ARE UNABLE TO REACH   YOUR PHYSICIAN - GO DIRECTLY TO THE EMERGENCY ROOM.  Roe Pereira MD  9/22/2020 8:59:56 AM  This report has been verified and signed electronically.  [employer]

## 2020-09-22 NOTE — ANESTHESIA PREPROCEDURE EVALUATION
09/22/2020  Janneth Javier is a 35 y.o., female for upper EUS under MAC    Past Medical History:   Diagnosis Date    Multiple thyroid nodules 3/1/2019    US showed benign nodules         Pre-op Assessment    I have reviewed the Patient Summary Reports.     I have reviewed the Nursing Notes.    I have reviewed the Medications.     Review of Systems  Social:  Non-Smoker        Physical Exam  General:  Obesity    Airway/Jaw/Neck:  Airway Findings: Mallampati: II      Chest/Lungs:  Chest/Lungs Clear    Heart/Vascular:  Heart Findings: Normal            Anesthesia Plan  Type of Anesthesia, risks & benefits discussed:  Anesthesia Type:  MAC, general  Patient's Preference:   Intra-op Monitoring Plan:   Intra-op Monitoring Plan Comments:   Post Op Pain Control Plan:   Post Op Pain Control Plan Comments:   Induction:    Beta Blocker:  Patient is not currently on a Beta-Blocker (No further documentation required).       Informed Consent: Patient understands risks and agrees with Anesthesia plan.  Questions answered. Anesthesia consent signed with patient.  ASA Score: 2     Day of Surgery Review of History & Physical:            Ready For Surgery From Anesthesia Perspective.

## 2020-09-24 LAB
AMYLASE, PANCREATIC FLUID: 106 U/L
BDY SITE: NORMAL
BDY SITE: NORMAL
CEA FLD-MCNC: <0.5 NG/ML

## 2020-09-28 ENCOUNTER — TELEPHONE (OUTPATIENT)
Dept: GASTROENTEROLOGY | Facility: CLINIC | Age: 35
End: 2020-09-28

## 2020-09-28 ENCOUNTER — LAB VISIT (OUTPATIENT)
Dept: LAB | Facility: HOSPITAL | Age: 35
End: 2020-09-28
Attending: INTERNAL MEDICINE
Payer: COMMERCIAL

## 2020-09-28 ENCOUNTER — PATIENT MESSAGE (OUTPATIENT)
Dept: GASTROENTEROLOGY | Facility: CLINIC | Age: 35
End: 2020-09-28

## 2020-09-28 ENCOUNTER — TELEPHONE (OUTPATIENT)
Dept: INTERNAL MEDICINE | Facility: CLINIC | Age: 35
End: 2020-09-28

## 2020-09-28 DIAGNOSIS — E78.1 HYPERTRIGLYCERIDEMIA: ICD-10-CM

## 2020-09-28 LAB
CHOLEST SERPL-MCNC: 176 MG/DL (ref 120–199)
CHOLEST/HDLC SERPL: 4.3 {RATIO} (ref 2–5)
HDLC SERPL-MCNC: 41 MG/DL (ref 40–75)
HDLC SERPL: 23.3 % (ref 20–50)
LDLC SERPL CALC-MCNC: 97.4 MG/DL (ref 63–159)
NONHDLC SERPL-MCNC: 135 MG/DL
TRIGL SERPL-MCNC: 188 MG/DL (ref 30–150)

## 2020-09-28 PROCEDURE — 83701 LIPOPROTEIN BLD HR FRACTION: CPT

## 2020-09-28 PROCEDURE — 80061 LIPID PANEL: CPT

## 2020-09-28 PROCEDURE — 36415 COLL VENOUS BLD VENIPUNCTURE: CPT | Mod: PO

## 2020-09-28 NOTE — TELEPHONE ENCOUNTER
Post procedure Instructions: Perform magnetic resonance imaging (MRI) with gadolinium in 6 months. Please place on recall.

## 2020-09-28 NOTE — TELEPHONE ENCOUNTER
----- Message from Eunice Presley MD sent at 9/28/2020  1:02 PM CDT -----  Results released to patient portal.

## 2020-09-29 ENCOUNTER — IMMUNIZATION (OUTPATIENT)
Dept: INTERNAL MEDICINE | Facility: CLINIC | Age: 35
End: 2020-09-29
Payer: COMMERCIAL

## 2020-09-29 PROCEDURE — 90686 FLU VACCINE (QUAD) GREATER THAN OR EQUAL TO 3YO PRESERVATIVE FREE IM: ICD-10-PCS | Mod: S$GLB,,, | Performed by: FAMILY MEDICINE

## 2020-09-29 PROCEDURE — 90686 IIV4 VACC NO PRSV 0.5 ML IM: CPT | Mod: S$GLB,,, | Performed by: FAMILY MEDICINE

## 2020-09-29 PROCEDURE — 90471 IMMUNIZATION ADMIN: CPT | Mod: S$GLB,,, | Performed by: FAMILY MEDICINE

## 2020-09-29 PROCEDURE — 90471 FLU VACCINE (QUAD) GREATER THAN OR EQUAL TO 3YO PRESERVATIVE FREE IM: ICD-10-PCS | Mod: S$GLB,,, | Performed by: FAMILY MEDICINE

## 2020-09-30 ENCOUNTER — PATIENT OUTREACH (OUTPATIENT)
Dept: ADMINISTRATIVE | Facility: HOSPITAL | Age: 35
End: 2020-09-30

## 2020-10-01 ENCOUNTER — PATIENT MESSAGE (OUTPATIENT)
Dept: GASTROENTEROLOGY | Facility: CLINIC | Age: 35
End: 2020-10-01

## 2020-10-01 LAB — LDLC SERPL-MCNC: 103 MG/DL

## 2020-12-26 ENCOUNTER — TELEPHONE (OUTPATIENT)
Dept: ENDOSCOPY | Facility: HOSPITAL | Age: 35
End: 2020-12-26

## 2020-12-26 DIAGNOSIS — K86.2 PANCREATIC CYST: ICD-10-CM

## 2021-01-07 ENCOUNTER — PATIENT MESSAGE (OUTPATIENT)
Dept: ENDOSCOPY | Facility: HOSPITAL | Age: 36
End: 2021-01-07

## 2021-01-07 ENCOUNTER — TELEPHONE (OUTPATIENT)
Dept: ENDOSCOPY | Facility: HOSPITAL | Age: 36
End: 2021-01-07

## 2021-01-22 ENCOUNTER — PATIENT MESSAGE (OUTPATIENT)
Dept: ADMINISTRATIVE | Facility: OTHER | Age: 36
End: 2021-01-22

## 2021-04-28 ENCOUNTER — PATIENT MESSAGE (OUTPATIENT)
Dept: RESEARCH | Facility: HOSPITAL | Age: 36
End: 2021-04-28

## 2021-09-20 ENCOUNTER — PATIENT MESSAGE (OUTPATIENT)
Dept: INTERNAL MEDICINE | Facility: CLINIC | Age: 36
End: 2021-09-20

## 2021-09-21 ENCOUNTER — PATIENT MESSAGE (OUTPATIENT)
Dept: INTERNAL MEDICINE | Facility: CLINIC | Age: 36
End: 2021-09-21

## 2021-09-23 ENCOUNTER — PATIENT MESSAGE (OUTPATIENT)
Dept: INTERNAL MEDICINE | Facility: CLINIC | Age: 36
End: 2021-09-23

## 2021-09-24 ENCOUNTER — PATIENT MESSAGE (OUTPATIENT)
Dept: INTERNAL MEDICINE | Facility: CLINIC | Age: 36
End: 2021-09-24

## 2021-10-04 ENCOUNTER — PATIENT MESSAGE (OUTPATIENT)
Dept: ADMINISTRATIVE | Facility: HOSPITAL | Age: 36
End: 2021-10-04

## 2022-01-18 ENCOUNTER — PATIENT MESSAGE (OUTPATIENT)
Dept: ADMINISTRATIVE | Facility: HOSPITAL | Age: 37
End: 2022-01-18
Payer: COMMERCIAL

## 2022-03-16 ENCOUNTER — PATIENT MESSAGE (OUTPATIENT)
Dept: ADMINISTRATIVE | Facility: HOSPITAL | Age: 37
End: 2022-03-16
Payer: COMMERCIAL